# Patient Record
Sex: MALE | NOT HISPANIC OR LATINO | Employment: FULL TIME | URBAN - METROPOLITAN AREA
[De-identification: names, ages, dates, MRNs, and addresses within clinical notes are randomized per-mention and may not be internally consistent; named-entity substitution may affect disease eponyms.]

---

## 2022-07-08 ENCOUNTER — APPOINTMENT (OUTPATIENT)
Dept: RADIOLOGY | Facility: MEDICAL CENTER | Age: 27
End: 2022-07-08
Attending: EMERGENCY MEDICINE
Payer: COMMERCIAL

## 2022-07-08 ENCOUNTER — HOSPITAL ENCOUNTER (EMERGENCY)
Facility: MEDICAL CENTER | Age: 27
End: 2022-07-08
Attending: EMERGENCY MEDICINE
Payer: COMMERCIAL

## 2022-07-08 VITALS
HEIGHT: 72 IN | OXYGEN SATURATION: 97 % | TEMPERATURE: 97.7 F | SYSTOLIC BLOOD PRESSURE: 118 MMHG | DIASTOLIC BLOOD PRESSURE: 77 MMHG | HEART RATE: 62 BPM | RESPIRATION RATE: 18 BRPM | WEIGHT: 250.66 LBS | BODY MASS INDEX: 33.95 KG/M2

## 2022-07-08 DIAGNOSIS — S61.209A OPEN WOUND OF FINGER WITH TENDON INJURY, INITIAL ENCOUNTER: ICD-10-CM

## 2022-07-08 DIAGNOSIS — S62.639B OPEN FRACTURE OF TUFT OF DISTAL PHALANX OF FINGER: ICD-10-CM

## 2022-07-08 DIAGNOSIS — S69.91XA INJURY OF FINGER OF RIGHT HAND, INITIAL ENCOUNTER: ICD-10-CM

## 2022-07-08 DIAGNOSIS — T14.8XXA OPEN FRACTURE: ICD-10-CM

## 2022-07-08 PROCEDURE — A9270 NON-COVERED ITEM OR SERVICE: HCPCS | Performed by: EMERGENCY MEDICINE

## 2022-07-08 PROCEDURE — 99284 EMERGENCY DEPT VISIT MOD MDM: CPT

## 2022-07-08 PROCEDURE — 304217 HCHG IRRIGATION SYSTEM

## 2022-07-08 PROCEDURE — 96365 THER/PROPH/DIAG IV INF INIT: CPT

## 2022-07-08 PROCEDURE — 73110 X-RAY EXAM OF WRIST: CPT | Mod: RT

## 2022-07-08 PROCEDURE — 304999 HCHG REPAIR-SIMPLE/INTERMED LEVEL 1

## 2022-07-08 PROCEDURE — 700102 HCHG RX REV CODE 250 W/ 637 OVERRIDE(OP): Performed by: EMERGENCY MEDICINE

## 2022-07-08 PROCEDURE — 303747 HCHG EXTRA SUTURE

## 2022-07-08 PROCEDURE — 700111 HCHG RX REV CODE 636 W/ 250 OVERRIDE (IP): Performed by: EMERGENCY MEDICINE

## 2022-07-08 PROCEDURE — 304992 HCHG REPAIR-COMPLEX-LVL 1,1.1-7.5CM

## 2022-07-08 PROCEDURE — 73130 X-RAY EXAM OF HAND: CPT | Mod: RT

## 2022-07-08 RX ORDER — CEFAZOLIN SODIUM 2 G/100ML
2 INJECTION, SOLUTION INTRAVENOUS ONCE
Status: COMPLETED | OUTPATIENT
Start: 2022-07-08 | End: 2022-07-08

## 2022-07-08 RX ORDER — OXYCODONE HYDROCHLORIDE AND ACETAMINOPHEN 5; 325 MG/1; MG/1
1 TABLET ORAL ONCE
Status: COMPLETED | OUTPATIENT
Start: 2022-07-08 | End: 2022-07-08

## 2022-07-08 RX ORDER — OXYCODONE HYDROCHLORIDE AND ACETAMINOPHEN 5; 325 MG/1; MG/1
1 TABLET ORAL EVERY 4 HOURS PRN
Qty: 20 TABLET | Refills: 0 | Status: SHIPPED | OUTPATIENT
Start: 2022-07-08 | End: 2022-07-13

## 2022-07-08 RX ORDER — CEPHALEXIN 500 MG/1
500 CAPSULE ORAL 4 TIMES DAILY
Qty: 28 CAPSULE | Refills: 0 | Status: SHIPPED | OUTPATIENT
Start: 2022-07-08 | End: 2022-07-15

## 2022-07-08 RX ADMIN — OXYCODONE HYDROCHLORIDE AND ACETAMINOPHEN 1 TABLET: 5; 325 TABLET ORAL at 10:27

## 2022-07-08 RX ADMIN — CEFAZOLIN SODIUM 2 G: 2 INJECTION, SOLUTION INTRAVENOUS at 10:23

## 2022-07-08 RX ADMIN — LIDOCAINE HYDROCHLORIDE 20 ML: 10 INJECTION, SOLUTION EPIDURAL; INFILTRATION; INTRACAUDAL; PERINEURAL at 09:00

## 2022-07-08 ASSESSMENT — PAIN DESCRIPTION - PAIN TYPE: TYPE: ACUTE PAIN

## 2022-07-08 ASSESSMENT — ENCOUNTER SYMPTOMS: SENSORY CHANGE: 0

## 2022-07-08 NOTE — ED PROVIDER NOTES
ED Provider Note    Scribed for Stacie Byrne M.D. by Maximiliano Sotelo. 7/8/2022, 8:25 AM.    Primary care provider: Pcp Pt States None  Means of arrival: Walk-in  History obtained from: Patient  History limited by: None    CHIEF COMPLAINT  Chief Complaint   Patient presents with   • Digit Pain     Pt reports slamming his index, middle, and ring finger of his right hand in a door this morning and he thinks they are broken.        HPI  Mannie Rothman is a 26 y.o. male who presents to the Emergency Department evaluation of right digit pain onset this morning. He reports that he slammed his second, third, and fourth digit of his right hand in a door. He thinks they are broken.  He reports significant throbbing pain to the right second third and fourth digits.  No alleviating or exacerbating factors noted.    REVIEW OF SYSTEMS  Review of Systems   Musculoskeletal:        Positive for right digit pain.   Neurological: Negative for sensory change.         PAST MEDICAL HISTORY   None pertinent    SURGICAL HISTORY  patient denies any surgical history    SOCIAL HISTORY  Social History     Tobacco Use   • Smoking status: Never Smoker   • Smokeless tobacco: Never Used   Substance Use Topics   • Alcohol use: No   • Drug use: No      Social History     Substance and Sexual Activity   Drug Use No       FAMILY HISTORY  No family history pertinent.    CURRENT MEDICATIONS  Home Medications     Reviewed by Abigail Treviño R.N. (Registered Nurse) on 07/08/22 at 0751  Med List Status: Not Addressed   Medication Last Dose Status   aspirin (ASA) 81 MG CHEW  Active   azithromycin (ZITHROMAX Z-PAN) 250 MG TABS  Active   hydrocodone-acetaminophen (NORCO) 5-325 MG TABS per tablet  Active   ondansetron (ZOFRAN) 4 MG TABS  Active                ALLERGIES  No Known Allergies    PHYSICAL EXAM  VITAL SIGNS: BP (!) 165/87   Pulse (!) 108   Temp 36.4 °C (97.6 °F) (Temporal)   Resp 18   Ht 1.829 m (6')   Wt 114 kg (250 lb 10.6 oz)   SpO2  97%   BMI 34.00 kg/m²   Vitals reviewed by myself.  Nursing note and vitals reviewed.  Constitutional: Well-developed and well-nourished.  Patient appears uncomfortable  HENT: Head is normocephalic and atraumatic.  Eyes: extra-ocular movements intact  Cardiovascular: Tachycardic rate and regular rhythm.  Brisk capillary refill present in all distal fingertips, 2+ bilateral radial pulses  Pulmonary/Chest: Breath sounds normal. No wheezes or rales.   Musculoskeletal: Patient has significant swelling to the right index, middle and ring fingers.  There is good distal perfusion and sensation in all the fingertips.  The right index finger has full range of motion at the MCP, PIP and DIP joints.  Right middle finger has limited range of motion at the DIP joint, normal range of motion at PIP and MCP joint.  Right ring finger has normal range of motion at MCP, PIP and DIP joints.  Neurological: Awake and alert, sensation intact in all distal fingertips  Skin: Skin is warm and dry. No rash. Laceration to nail bed and distal right ring finger that totals 3 cm. Jagged complex deep laceration of the right middle finger on the palmar aspect that totalled 4 cm, underlying tendon is exposed with concern for injury to tendon. Laceration to the tip of the right middle finger that totalled 1 cm. Skin avulsion type laceration located on the right index finger (on palmar aspect) that totalled 3 cm.    RADIOLOGY  DX-WRIST-COMPLETE 3+ RIGHT   Final Result      No acute osseous abnormality.      DX-HAND 3+ RIGHT   Final Result      Acute closed displaced comminuted fracture the arturo of the right second through fourth finger distal phalanges, with regional soft tissue swelling.        The radiologist's interpretation of all radiological studies have been reviewed by me.      PROCEDURES    Laceration Repair Procedure Note #1    Indication: Laceration of nail bed and distal right ring finger  Procedure: The patient was placed in the  appropriate position and anesthesia around the laceration was obtained by digital block using 1% Lidocaine without epinephrine.  The nail was loosely attached to the skin and therefore was removed.  The area was then irrigated with normal saline. The laceration was located on the nail bed and distal right ring finger that totals 3 cm. It was repaired with 3 stitches of 5-0 Ethilon. The wound area was then dressed with a sterile dressing.    Total repaired wound length: 3 cm.   Other Items: Suture count: 3  The patient tolerated the procedure well.  Complications: None      Laceration Repair Procedure Note #2    Indication: Laceration to right middle finger palmar aspect  Procedure: The patient was placed in the appropriate position and anesthesia around the laceration was obtained by digital block using 1% Lidocaine without epinephrine. The area was then irrigated with normal saline. It was a jagged complex deep laceration on the middle finger on the palmar aspect that totalled 4 cm. It was closed in 6 stitches of 5-0 Ethilon. The wound area was then dressed with a sterile dressing.    Total repaired wound length: 4 cm.   Other Items: Suture count: 6  The patient tolerated the procedure well.  Complications: None      Laceration Repair Procedure Note #3  Indication: Laceration distal middle finger  Procedure: The patient was placed in the appropriate position and anesthesia around the laceration was obtained by digital block using 1% Lidocaine without epinephrine. The area was then irrigated with normal saline. The laceration was located to the tip of the right middle finger. It totalled 1 cm. It was closed with 2 stitches of 5-0 Ethilon. There were no additional lacerations requiring repair. The wound area was then dressed with a sterile dressing.    Total repaired wound length: 1 cm.   Other Items: Suture count: 2  The patient tolerated the procedure well.  Complications: None      Laceration Repair Procedure Note  #4  Indication: Laceration to index finger  Procedure: The patient was placed in the appropriate position and anesthesia around the laceration was obtained by digital block using 1% Lidocaine without epinephrine. The area was then irrigated with normal saline. The laceration was located on the right index finger and was a skin avulsion type laceration. It totalled 3 cm. It was closed with 4 stitches of 5-0 Ethilon. There were no additional lacerations requiring repair. The wound area was then dressed with a sterile dressing.    Total repaired wound length: 3 cm.   Other Items: Suture count: 4  The patient tolerated the procedure well.  Complications: None      REASSESSMENT  8:33 AM - Patient seen and evaluated at bedside. Discussed plan of care with patient, including imaging. He is understandable and agreeable with the plan of care.    9:35 AM - I discussed the patient's case and the above findings with Dr. Vidal (Ortho) so he is aware of the patient.    9:56 AM - Performed laceration repair procedures. See details above. Instructed patient to follow up with orthopedics. Patient will be started on antibiotics. He will also be medicated for pain. He is understandable and agreeable with the plan of care. Discussed discharge instructions and return precautions with the patient and they were cleared for discharge. Patient was given the opportunity to ask any further questions. He is comfortable with discharge at this time.      COURSE & MEDICAL DECISION MAKING  Nursing notes, VS, PMSFHx reviewed in chart.    Patient is a 26-year-old male who comes in for evaluation of crush injury to the hand.  Differential diagnosis includes fracture, open fracture, laceration, tendon injury.  Diagnostic work-up includes x-rays of the right hand and wrist.    Patient's initial vitals are notable for tachycardia and hypertension likely secondary to pain.  Patient is neurovascularly intact on exam.  He is treated with Percocet and  digital finger blocks after which he feels improved.  Imaging returns and is notable for distal tuft fractures of the right second through fourth digits.  Given overlying lacerations these are open and patient is treated with Ancef.  Given the open fractures and concern for tendon injury of the middle finger as he has limited range of motion at the DIP I discussed the case with Dr. Vidal who does recommend closure of lacerations and cloase follow-up with hand surgeon.  Patient is amenable to this plan.  He is given wound care instructions and provided with prescription for Keflex and Percocet for pain management.  He is then given strict return precautions and discharged in stable condition.      I reviewed prescription monitoring program for patient's narcotic use before prescribing a scheduled drug.The patient will not drink alcohol nor drive with prescribed medications. The patient will return for new or worsening symptoms and is stable at the time of discharge.    The patient is referred to a primary physician for blood pressure management, diabetic screening, and for all other preventative health concerns.    In prescribing controlled substances to this patient, I certify that I have obtained and reviewed the medical history of Mannie Rothman. I have also made a good ubaldo effort to obtain applicable records from other providers who have treated the patient and records did not demonstrate any increased risk of substance abuse that would prevent me from prescribing controlled substances.     I have conducted a physical exam and documented it. I have reviewed Mr. Rothman’s prescription history as maintained by the [place] Prescription Monitoring Program.     I have assessed the patient’s risk for abuse, dependency, and addiction using the validated Opioid Risk Tool available at https://www.mdcalc.com/rxdrof-prgj-uddx-ort-narcotic-abuse.     Given the above, I believe the benefits of controlled substance therapy  outweigh the risks. The reasons for prescribing controlled substances include non-narcotic, oral analgesic alternatives have been inadequate for pain control. Accordingly, I have discussed the risk and benefits, treatment plan, and alternative therapies with the patient.       DISPOSITION:  Patient will be discharged home in stable condition.    FOLLOW UP:  Dipak Ramon M.D.  [address]  [address]  [place] [address]  [phone]    Follow up  Call ASAP and to schedule fu appt with Dr. Ramon or Dr. Fu on monday or early next week      OUTPATIENT MEDICATIONS:  New Prescriptions    CEPHALEXIN (KEFLEX) 500 MG CAP    Take 1 Capsule by mouth 4 times a day for 7 days.    OXYCODONE-ACETAMINOPHEN (PERCOCET) 5-325 MG TAB    Take 1 Tablet by mouth every four hours as needed for Moderate Pain or Severe Pain for up to 5 days.       FINAL IMPRESSION  1. Injury of finger of right hand, initial encounter    2. Open wound of finger with tendon injury, initial encounter    3. Open fracture    4. Open fracture of tuft of distal phalanx of finger          Maximiliano VEE (Melidaibdanni), am scribing for, and in the presence of, Stacie Byrne M.D..    Electronically signed by: Maximiliano Sotelo (Amandeep), 7/8/2022    IStacie M.D. personally performed the services described in this documentation, as scribed by Maximiliano Sotelo in my presence, and it is both accurate and complete.    The note accurately reflects work and decisions made by me.  Stacie Byrne M.D.  7/8/2022  3:35 PM    E

## 2022-07-08 NOTE — ED TRIAGE NOTES
Chief Complaint   Patient presents with   • Digit Pain     Pt reports slamming his index, middle, and ring finger of his right hand in a door this morning and he thinks they are broken.      BP (!) 165/87   Pulse (!) 108   Temp 36.4 °C (97.6 °F) (Temporal)   Resp 18   Ht 1.829 m (6')   Wt 114 kg (250 lb 10.6 oz)   SpO2 97%   BMI 34.00 kg/m²     Pt is ambulatory in and out of triage. Appropriate PPE worn throughout entire encounter. Pt placed back in the lobby and educated about triage process.  Right hand wrapped in triage with gauze, bleeding controlled at this time.

## 2022-07-08 NOTE — LETTER
FORM C-4:  EMPLOYEE’S CLAIM FOR COMPENSATION/ REPORT OF INITIAL TREATMENT  EMPLOYEE’S CLAIM - PROVIDE ALL INFORMATION REQUESTED   First Name Mannie Last Name Stephan Birthdate 1995  Sex male Claim Number   Home Address [address]             Zip [address]                                   Age  26 y.o. Height  1.829 m (6') Weight  114 kg (250 lb 10.6 oz) N  [id number]   Mailing Address [address]              Zip [address] Telephone  [phone] (home)  Primary Language Spoken  English   Insurer   Third Party  Employee's Occupation (Job Title) When Injury or Occupational Disease Occurred      Employer's Name [employer] Telephone [phone]    Employer Address [address] [facility] [place] [address]   Date of Injury   07/08/2022 Hour of Injury  7:30 am Date Employer Notified  07/08/2022 Last Day of Work after Injury or Occupational Disease  07/08/2022   Supervisor to Whom Injury Reported  Stefan   Address or Location of Accident (if applicable) [address]   What were you doing at the time of accident? (if applicable)   Crushing sheet metal   How did this injury or occupational disease occur? Be specific and answer in detail. Use additional sheet if necessary)  Fingers slipped and crushed by machine   If you believe that you have an occupational disease, when did you first have knowledge of the disability and it relationship to your employment? N/A Witnesses to the Accident  [place]   Nature of Injury or Occupational Disease  Workers Compensation Part(s) of Body Injured or Affected  , , index,middle,ring finger right hand    I CERTIFY THAT THE ABOVE IS TRUE AND CORRECT TO THE BEST OF MY KNOWLEDGE AND THAT I HAVE PROVIDED THIS INFORMATION IN ORDER TO OBTAIN THE BENEFITS OF [place]).  I HEREBY AUTHORIZE  ANY PHYSICIAN, CHIROPRACTOR, SURGEON, PRACTITIONER, OR OTHER PERSON, ANY HOSPITAL, INCLUDING [facility] OR [facility], ANY MEDICAL SERVICE ORGANIZATION, ANY INSURANCE COMPANY, OR OTHER INSTITUTION OR ORGANIZATION TO RELEASE TO EACH OTHER, ANY MEDICAL OR OTHER INFORMATION, INCLUDING BENEFITS PAID OR PAYABLE, PERTINENT TO THIS INJURY OR DISEASE, EXCEPT INFORMATION RELATIVE TO DIAGNOSIS, TREATMENT AND/OR COUNSELING FOR AIDS, PSYCHOLOGICAL CONDITIONS, ALCOHOL OR CONTROLLED SUBSTANCES, FOR WHICH I MUST GIVE SPECIFIC AUTHORIZATION.  A PHOTOSTAT OF THIS AUTHORIZATION SHALL BE AS VALID AS THE ORIGINAL.  Date 07/08/2022    [facility]  Employee’s Signature   THIS REPORT MUST BE COMPLETED AND MAILED WITHIN 3 WORKING DAYS OF TREATMENT   Place [facility], EMERGENCY DEPT                       Name of Facility [facility]   Date  7/8/2022 Diagnosis  (S69.91XA) Injury of finger of right hand, initial encounter  (S61.209A) Open wound of finger with tendon injury, initial encounter  (T14.8XXA) Open fracture  (S62.639B) Open fracture of tuft of distal phalanx of finger Is there evidence the injured employee was under the influence of alcohol and/or another controlled substance at the time of accident?   Hour  11:50 AM Description of Injury or Disease  Injury of finger of right hand, initial encounter  Open wound of finger with tendon injury, initial encounter  Open fracture  Open fracture of tuft of distal phalanx of finger No   Treatment  Physical exam, x-rays, pain management, laceration repair  Have you advised the patient to remain off work five days or more?         Yes   X-Ray Findings    Comments:Positive for multiple fingertip fractures If Yes   From Date    To Date      From information given by the employee, together with medical evidence, can you directly connect this injury or occupational disease as job incurred? Yes If No, is employee  "capable of: Full Duty  No Modified Duty  No   Is additional medical care by a physician indicated? Yes  Comments:Patient will require follow-up with a hand surgeon for possible surgical intervention If Modified Duty, Specify any Limitations / Restrictions       Do you know of any previous injury or disease contributing to this condition or occupational disease? No    Date 7/8/2022 Print Doctor’s Name Stacie Byrne certify the employer’s copy of this form was mailed on:   Address [address] [place] [id number] [phone] INSURER’S USE ONLY   Provider’s Tax ID Number [id number] Telephone Dept: [phone]    Doctor’s Signature danni-SignSTACIE BYRNE M.D. Degree  M.D.      Form C-4 (rev.10/07)                                                                         BRIEF DESCRIPTION OF RIGHTS AND BENEFITS  ([place]ursuant to [place])    Notice of Injury or Occupational Disease (Incident Report Form C-1): If an injury or occupational disease (OD) arises out of and in the course of employment, you must provide written notice to your employer as soon as practicable, but no later than 7 days after the accident or OD. Your employer shall maintain a sufficient supply of the required forms.    Claim for Compensation (Form C-4): If medical treatment is sought, the form C-4 is available at the place of initial treatment. A completed \"Claim for Compensation\" (Form C-4) must be filed within 90 days after an accident or OD. The treating physician or chiropractor must, within 3 working days after treatment, complete and mail to the employer, the employer's insurer and third-party , the Claim for Compensation.    Medical Treatment: If you require medical treatment for your on-the-job injury or OD, you may be required to select a physician or chiropractor from a list provided by your workers’ compensation insurer, if it has contracted with an Organization for Managed Care (MCO) or Preferred Provider " Organization (PPO) or providers of health care. If your employer has not entered into a contract with an MCO or PPO, you may select a physician or chiropractor from the Panel of Physicians and Chiropractors. Any medical costs related to your industrial injury or OD will be paid by your insurer.    Temporary Total Disability (TTD): If your doctor has certified that you are unable to work for a period of at least 5 consecutive days, or 5 cumulative days in a 20-day period, or places restrictions on you that your employer does not accommodate, you may be entitled to TTD compensation.    Temporary Partial Disability (TPD): If the wage you receive upon reemployment is less than the compensation for TTD to which you are entitled, the insurer may be required to pay you TPD compensation to make up the difference. TPD can only be paid for a maximum of 24 months.    Permanent Partial Disability (PPD): When your medical condition is stable and there is an indication of a PPD as a result of your injury or OD, within 30 days, your insurer must arrange for an evaluation by a rating physician or chiropractor to determine the degree of your PPD. The amount of your PPD award depends on the date of injury, the results of the PPD evaluation, your age and wage.    Permanent Total Disability (PTD): If you are medically certified by a treating physician or chiropractor as permanently and totally disabled and have been granted a PTD status by your insurer, you are entitled to receive monthly benefits not to exceed 66 2/3% of your average monthly wage. The amount of your PTD payments is subject to reduction if you previously received a lump-sum PPD award.    Vocational Rehabilitation Services: You may be eligible for vocational rehabilitation services if you are unable to return to the job due to a permanent physical impairment or permanent restrictions as a result of your injury or occupational disease.    Transportation and Per Margarita  Reimbursement: You may be eligible for travel expenses and per avery associated with medical treatment.    Reopening: You may be able to reopen your claim if your condition worsens after claim closure.     Appeal Process: If you disagree with a written determination issued by the insurer or the insurer does not respond to your request, you may appeal to the Department of Administration, , by following the instructions contained in your determination letter. You must appeal the determination within 70 days from the date of the determination letter at [address], [address], [place], [place] [address], or [address], [address], [place], [place] [address]. If you disagree with the  decision, you may appeal to the Department of Administration, . You must file your appeal within 30 days from the date of the  decision letter at [address], [address], [place], [place] [address], or [address], [address], [place], [place] [address]. If you disagree with a decision of an , you may file a petition for judicial review with the District Court. You must do so within 30 days of the Appeal Officer’s decision. You may be represented by an  at your own expense or you may contact the [facility] for possible representation.    [place]  for Injured Workers (NAIW): If you disagree with a  decision, you may request that NAIW represent you without charge at an  Hearing. For information regarding denial of benefits, you may contact the [facility] at: [address], [address], [place] [address], [phone], or [address], [facility] [address] [address], [phone]    To File a Complaint with the Division: If you wish to file a complaint with the  of the Division of Industrial Relations (DIR),  please contact the Workers’ Compensation Section, [address] [place] [address]  [address], [address], [place], [place] [address], telephone [phone], or [address], [address], [place], [place] [address], telephone [phone].    For assistance with Workers’ Compensation Issues: You may contact the [facility] Office for Consumer Health Assistance, [address], [address], [place], [place] [address], Toll Free 1-334.736.4761, Web site: http://[facility].[place].gov/Programs/SANDRA E-mail: sandra@[facility].[place].gov  D-2 (rev. 10/20)              __________________________________________________________________                                    _________________            Employee Name / Signature                                                                                                                            Date

## 2022-07-08 NOTE — DISCHARGE INSTRUCTIONS
As we discussed you cannot drive on narcotic pain medications.  Please call orthopedic hand surgeon listed below to schedule follow-up appointment for Monday.  You have multiple cuts to your fingers that were repaired with stitches that will need to be removed.  When they need to be removed will be determined by the hand surgeon.

## 2022-07-08 NOTE — ED NOTES
Discharge instructions given.  All questions answered.  Prescriptions given x2, consent controlled substances signed and in chart.  Pt to follow-up with Ortho, return to ER if symptoms worsen as discussed.  Pt verbalized understanding.  All belongings with pt.  Pt ambulated to lobby.

## 2022-07-08 NOTE — ED NOTES
Patient ambulated to Ortho 1 without incident. Patient oriented to care area. Primary assessment complete. Chart up for ERP.

## 2022-07-11 ENCOUNTER — HOSPITAL ENCOUNTER (EMERGENCY)
Facility: MEDICAL CENTER | Age: 27
End: 2022-07-11
Attending: EMERGENCY MEDICINE

## 2022-07-11 VITALS
HEART RATE: 53 BPM | OXYGEN SATURATION: 97 % | RESPIRATION RATE: 14 BRPM | BODY MASS INDEX: 34.38 KG/M2 | DIASTOLIC BLOOD PRESSURE: 78 MMHG | TEMPERATURE: 97.1 F | WEIGHT: 253.53 LBS | SYSTOLIC BLOOD PRESSURE: 115 MMHG

## 2022-07-11 DIAGNOSIS — Z76.0 MEDICATION REFILL: ICD-10-CM

## 2022-07-11 PROCEDURE — 99282 EMERGENCY DEPT VISIT SF MDM: CPT

## 2022-07-11 RX ORDER — OXYCODONE HYDROCHLORIDE AND ACETAMINOPHEN 5; 325 MG/1; MG/1
1 TABLET ORAL EVERY 4 HOURS PRN
Qty: 10 TABLET | Refills: 0 | Status: SHIPPED | OUTPATIENT
Start: 2022-07-11 | End: 2022-07-14

## 2022-07-11 NOTE — DISCHARGE INSTRUCTIONS
You have received narcotic medication while in the emergency department. Please do not drive or operate heavy machinery within 24 hours as narcotics can disrupt your ability to concentrate or function appropriately.

## 2022-07-11 NOTE — ED NOTES
Patient seen at bedside; they have no new complaints at this time and vital signs are stable. Patient given discharge paperwork and given opportunity to ask questions. Patient verbalized understanding of discharge instructions and return precautions. Patient ambulated from ED with steady gait.

## 2022-07-11 NOTE — ED TRIAGE NOTES
Pt to triage .  .  Chief Complaint   Patient presents with   • Medication Refill     Pt requesting prescription refill for oxycodone. Pt was seen in ED and referred to specialist but unable to get into appnt until Wednesday

## 2022-07-11 NOTE — ED NOTES
Patient states he injured his hand 3 days ago, and is requesting a refill of his oxycodone as he has run out and does not see his Hand Specialist for another 2 days. Right hand is currently in splint. Patient has no other complaints.     Chart up for ERP to see.

## 2022-07-11 NOTE — ED PROVIDER NOTES
ED Provider Note    CHIEF COMPLAINT  Chief Complaint   Patient presents with   • Medication Refill     Pt requesting prescription refill for oxycodone. Pt was seen in ED 7/8 and referred to specialist for a hand injury but unable to get into appnt until Wednesday        DALY Rothman is a 26 y.o. male who presents complaint of being out of his narcotic pain medication and ran today.  He did crush his fingers and the metal plate on 7/8/2022 and had open laceration of the right hand.  The patient was seen here, has been given prescription for narcotics and antibiotics.  He states that he was supposed to see the specialist today with a post about 2 more days and he is currently out of his pain medications asking for small refill of his pain medication.  REVIEW OF SYSTEMS  Pertinent positives include pain to the right hand   Pertinent negatives include loss of sensation or strength to hand, fever, discharge from the wound  PAST MEDICAL HISTORY  History reviewed. No pertinent past medical history.    FAMILY HISTORY  No family history on file.    SOCIAL HISTORY  Social History     Socioeconomic History   • Marital status: Single   Tobacco Use   • Smoking status: Never Smoker   • Smokeless tobacco: Never Used   Substance and Sexual Activity   • Alcohol use: No   • Drug use: No       SURGICAL HISTORY  History reviewed. No pertinent surgical history.    CURRENT MEDICATIONS  Home Medications     Reviewed by Alexus Lee R.N. (Registered Nurse) on 07/11/22 at 1335  Med List Status: Partial   Medication Last Dose Status   aspirin (ASA) 81 MG CHEW 7/11/2022 Active   azithromycin (ZITHROMAX Z-PAN) 250 MG TABS 7/11/2022 Active   cephALEXin (KEFLEX) 500 MG Cap 7/11/2022 Active   hydrocodone-acetaminophen (NORCO) 5-325 MG TABS per tablet  Active   ondansetron (ZOFRAN) 4 MG TABS  Active   oxyCODONE-acetaminophen (PERCOCET) 5-325 MG Tab running out Active              ALLERGIES  No Known Allergies    PHYSICAL EXAM  VITAL SIGNS:  /78   Pulse (!) 53   Temp 36.2 °C (97.1 °F) (Temporal)   Resp 14   Wt 115 kg (253 lb 8.5 oz)   SpO2 97%   BMI 34.38 kg/m²      Constitutional: Well developed, Well nourished, No acute distress, Non-toxic appearance.   Eyes: PERRLA, EOMI, Conjunctiva normal, No discharge.   Extremities: Patient's right upper extremity is splinted in slight flexion at the wrist, distal cap refill less than 2 seconds  Neurologic: Sensation and strength intact to the right upper extremity    COURSE & MEDICAL DECISION MAKING  Pertinent Labs & Imaging studies reviewed. (See chart for details)  This is a pleasant 26-year-old male presents for medication refill.  Here in the emergency department, the patient has no evidence of focal neurological vascular deficits.  The patient received a prescription for 10 tabs of Percocet as the patient's appointment has been pushed out 2 days.    Discharge Medication List as of 7/11/2022  2:55 PM      START taking these medications    Details   !! oxyCODONE-acetaminophen (PERCOCET) 5-325 MG Tab Take 1 Tablet by mouth every four hours as needed (pain) for up to 3 days., Disp-10 Tablet, R-0, Normal       !! - Potential duplicate medications found. Please discuss with provider.          FINAL IMPRESSION     1. Medication refill Active   In prescribing controlled substances to this patient, I certify that I have obtained and reviewed the medical history of Mannie Rothman. I have also made a good ubaldo effort to obtain applicable records from other providers who have treated the patient and records did not demonstrate any increased risk of substance abuse that would prevent me from prescribing controlled substances.     I have conducted a physical exam and documented it. I have reviewed Mr. Rothman’s prescription history as maintained by the [place] Prescription Monitoring Program.     I have assessed the patient’s risk for abuse, dependency, and addiction using the validated Opioid Risk Tool  available at https://www.mdcalc.com/yeowxn-haxh-eydm-ort-narcotic-abuse.     Given the above, I believe the benefits of controlled substance therapy outweigh the risks. The reasons for prescribing controlled substances include non-narcotic, oral analgesic alternatives have been inadequate for pain control. Accordingly, I have discussed the risk and benefits, treatment plan, and alternative therapies with the patient.     DISPOSITION:  Patient will be discharged home in stable condition.    FOLLOW UP:  [facility], Emergency Dept  [address] [place] [id number] [phone]    If symptoms worsen    OUTPATIENT MEDICATIONS:  Discharge Medication List as of 7/11/2022  2:55 PM      START taking these medications    Details   !! oxyCODONE-acetaminophen (PERCOCET) 5-325 MG Tab Take 1 Tablet by mouth every four hours as needed (pain) for up to 3 days., Disp-10 Tablet, R-0, Normal       !! - Potential duplicate medications found. Please discuss with provider.        Electronically signed by: Jone Ramirez D.O., 7/11/2022 2:38 PM

## 2023-01-26 ENCOUNTER — HOSPITAL ENCOUNTER (OUTPATIENT)
Facility: MEDICAL CENTER | Age: 28
End: 2023-01-26
Attending: NURSE PRACTITIONER

## 2023-01-26 ENCOUNTER — OFFICE VISIT (OUTPATIENT)
Dept: URGENT CARE | Facility: CLINIC | Age: 28
End: 2023-01-26

## 2023-01-26 VITALS
DIASTOLIC BLOOD PRESSURE: 82 MMHG | BODY MASS INDEX: 36.57 KG/M2 | OXYGEN SATURATION: 99 % | HEART RATE: 112 BPM | SYSTOLIC BLOOD PRESSURE: 136 MMHG | WEIGHT: 270 LBS | TEMPERATURE: 97.2 F | RESPIRATION RATE: 12 BRPM | HEIGHT: 72 IN

## 2023-01-26 DIAGNOSIS — Z20.2 EXPOSURE TO TRICHOMONAS: ICD-10-CM

## 2023-01-26 LAB
APPEARANCE UR: CLEAR
BILIRUB UR STRIP-MCNC: NORMAL MG/DL
COLOR UR AUTO: YELLOW
GLUCOSE UR STRIP.AUTO-MCNC: NORMAL MG/DL
KETONES UR STRIP.AUTO-MCNC: NORMAL MG/DL
LEUKOCYTE ESTERASE UR QL STRIP.AUTO: NORMAL
NITRITE UR QL STRIP.AUTO: NORMAL
PH UR STRIP.AUTO: 7.5 [PH] (ref 5–8)
PROT UR QL STRIP: NORMAL MG/DL
RBC UR QL AUTO: NORMAL
SP GR UR STRIP.AUTO: 1.02
UROBILINOGEN UR STRIP-MCNC: 0.2 MG/DL

## 2023-01-26 PROCEDURE — 87661 TRICHOMONAS VAGINALIS AMPLIF: CPT

## 2023-01-26 PROCEDURE — 99203 OFFICE O/P NEW LOW 30 MIN: CPT | Performed by: NURSE PRACTITIONER

## 2023-01-26 PROCEDURE — 81002 URINALYSIS NONAUTO W/O SCOPE: CPT | Performed by: NURSE PRACTITIONER

## 2023-01-26 RX ORDER — METRONIDAZOLE 500 MG/1
500 TABLET ORAL 2 TIMES DAILY
Qty: 14 TABLET | Refills: 0 | Status: SHIPPED | OUTPATIENT
Start: 2023-01-26 | End: 2023-02-02

## 2023-01-26 NOTE — PROGRESS NOTES
Mannie Rothman is a 27 y.o. male who presents for Sexually Transmitted Diseases (Pt requesting testing for STI, girlfriend told him to get tested. )      HPI  This is a new problem. Mannie Rothman is a 27 y.o. patient who presents to urgent care with c/o: Exposure to trichomoniasis from a new girlfriend.  She called them yesterday.  She is currently being treated.  He currently has no symptoms of discomfort with urination.  He denies penile discharge, lesions.  He has never had an STD before.  He is requesting treatment.  No other aggravating or alleviating factors.       ROS See HPI    Allergies:     No Known Allergies    PMSFS Hx:  History reviewed. No pertinent past medical history.  History reviewed. No pertinent surgical history.  History reviewed. No pertinent family history.  Social History     Tobacco Use    Smoking status: Never    Smokeless tobacco: Never   Substance Use Topics    Alcohol use: No       Problems:   There is no problem list on file for this patient.      Medications:   No current outpatient medications on file prior to visit.     No current facility-administered medications on file prior to visit.          Objective:     /82   Pulse (!) 112   Temp 36.2 °C (97.2 °F) (Temporal)   Resp 12   Ht 1.829 m (6')   Wt 122 kg (270 lb)   SpO2 99%   BMI 36.62 kg/m²     Physical Exam  Vitals reviewed.   Constitutional:       Appearance: Normal appearance. He is well-developed, well-groomed and normal weight.   Cardiovascular:      Rate and Rhythm: Normal rate.      Pulses: Normal pulses.   Pulmonary:      Effort: Pulmonary effort is normal.   Skin:     General: Skin is warm.      Capillary Refill: Capillary refill takes less than 2 seconds.   Neurological:      Mental Status: He is alert and oriented to person, place, and time.   Psychiatric:         Mood and Affect: Mood normal.         Behavior: Behavior normal. Behavior is cooperative.         Thought Content: Thought content normal.        Assessment /Associated Orders:      1. Exposure to trichomonas  POCT Urinalysis    TRICHOMONAS CULTURE    metroNIDAZOLE (FLAGYL) 500 MG Tab          Medical Decision Making:    Pt is clinically stable at today's acute urgent care visit.  No acute distress noted. Appropriate for outpatient care at this time.   Acute problem today .   Educated in proper administration of  prescription medication(s) ordered today including safety, possible SE, risks, benefits, rationale and alternatives to therapy.   Declines further STI testing ( HIV, Hep C, RPR, Hep B, GC/ CT) - will go to health department for screening    Advised use of condoms with 100% interaction to reduce her risk of STD  Abstain from intercourse for 7 days after treatment to avoid reinfection    Discussed Dx, management options (risks,benefits, and alternatives to planned treatment), natural progression and supportive care.  Expressed understanding and the treatment plan was agreed upon.   Questions were encouraged and answered   Return to urgent care prn if new or worsening sx or if there is no improvement in condition prn.            Please note that this dictation was created using voice recognition software. I have worked with consultants from the vendor as well as technical experts from [facility] [employer] to optimize the interface. I have made every reasonable attempt to correct obvious errors, but I expect that there are errors of grammar and possibly content that I did not discover before finalizing the note.  This note was electronically signed by provider

## 2023-01-29 LAB
SPEC CONTAINER SPEC: NORMAL
SPECIMEN SOURCE: NORMAL
T VAGINALIS RRNA SPEC QL NAA+PROBE: NEGATIVE